# Patient Record
Sex: FEMALE | Race: WHITE | Employment: UNEMPLOYED | URBAN - METROPOLITAN AREA
[De-identification: names, ages, dates, MRNs, and addresses within clinical notes are randomized per-mention and may not be internally consistent; named-entity substitution may affect disease eponyms.]

---

## 2019-03-26 PROBLEM — Z01.84 IMMUNITY STATUS TESTING: Status: ACTIVE | Noted: 2019-03-26

## 2019-03-26 PROBLEM — Z78.9 NOT IMMUNE TO RUBELLA: Status: RESOLVED | Noted: 2019-03-26 | Resolved: 2019-03-26

## 2019-03-26 PROBLEM — R79.89 ELEVATED TSH: Status: ACTIVE | Noted: 2019-03-26

## 2019-03-26 PROBLEM — Z78.9 NOT IMMUNE TO RUBELLA: Status: ACTIVE | Noted: 2019-03-26

## 2019-07-12 LAB
HEPATITIS B SURFACE ANTIGEN OB: NEGATIVE
HIV RESULT OB: NEGATIVE
RAPID PLASMA REAGIN OB: NONREACTIVE
RH FACTOR OB: POSITIVE

## 2019-08-01 PROCEDURE — 86480 TB TEST CELL IMMUN MEASURE: CPT | Performed by: INTERNAL MEDICINE

## 2019-08-14 PROCEDURE — 82784 ASSAY IGA/IGD/IGG/IGM EACH: CPT | Performed by: PEDIATRICS

## 2019-08-14 PROCEDURE — 86648 DIPHTHERIA ANTIBODY: CPT | Performed by: PEDIATRICS

## 2019-08-14 PROCEDURE — 86774 TETANUS ANTIBODY: CPT | Performed by: PEDIATRICS

## 2019-08-14 PROCEDURE — 82785 ASSAY OF IGE: CPT | Performed by: PEDIATRICS

## 2019-08-14 PROCEDURE — 82787 IGG 1 2 3 OR 4 EACH: CPT | Performed by: PEDIATRICS

## 2019-08-14 PROCEDURE — 86317 IMMUNOASSAY INFECTIOUS AGENT: CPT | Performed by: PEDIATRICS

## 2019-11-26 LAB — HIV RESULT OB: NEGATIVE

## 2020-01-22 LAB — STREP GP B CULT OB: NEGATIVE

## 2020-02-12 ENCOUNTER — ANESTHESIA EVENT (OUTPATIENT)
Dept: OBGYN UNIT | Facility: HOSPITAL | Age: 30
End: 2020-02-12
Payer: COMMERCIAL

## 2020-02-12 ENCOUNTER — ANESTHESIA (OUTPATIENT)
Dept: OBGYN UNIT | Facility: HOSPITAL | Age: 30
End: 2020-02-12
Payer: COMMERCIAL

## 2020-02-12 ENCOUNTER — HOSPITAL ENCOUNTER (INPATIENT)
Facility: HOSPITAL | Age: 30
LOS: 2 days | Discharge: HOME | End: 2020-02-14
Attending: OBSTETRICS & GYNECOLOGY | Admitting: OBSTETRICS & GYNECOLOGY
Payer: COMMERCIAL

## 2020-02-12 PROBLEM — Z34.90 PREGNANCY: Status: ACTIVE | Noted: 2020-02-12

## 2020-02-12 LAB
ALBUMIN SERPL-MCNC: 2.3 G/DL (ref 3.4–5)
ALBUMIN/GLOB SERPL: 0.7 {RATIO} (ref 1–2)
ALP LIVER SERPL-CCNC: 183 U/L (ref 37–98)
ALT SERPL-CCNC: 17 U/L (ref 13–56)
ANION GAP SERPL CALC-SCNC: 4 MMOL/L (ref 0–18)
ANTIBODY SCREEN: NEGATIVE
AST SERPL-CCNC: 27 U/L (ref 15–37)
BASOPHILS # BLD AUTO: 0.06 X10(3) UL (ref 0–0.2)
BASOPHILS NFR BLD AUTO: 0.2 %
BILIRUB SERPL-MCNC: 0.2 MG/DL (ref 0.1–2)
BUN BLD-MCNC: 13 MG/DL (ref 7–18)
BUN/CREAT SERPL: 14.9 (ref 10–20)
CALCIUM BLD-MCNC: 8.7 MG/DL (ref 8.5–10.1)
CHLORIDE SERPL-SCNC: 113 MMOL/L (ref 98–112)
CO2 SERPL-SCNC: 21 MMOL/L (ref 21–32)
CREAT BLD-MCNC: 0.87 MG/DL (ref 0.55–1.02)
DEPRECATED RDW RBC AUTO: 43.8 FL (ref 35.1–46.3)
DEPRECATED RDW RBC AUTO: 44 FL (ref 35.1–46.3)
EOSINOPHIL # BLD AUTO: 0.01 X10(3) UL (ref 0–0.7)
EOSINOPHIL NFR BLD AUTO: 0 %
ERYTHROCYTE [DISTWIDTH] IN BLOOD BY AUTOMATED COUNT: 13.6 % (ref 11–15)
ERYTHROCYTE [DISTWIDTH] IN BLOOD BY AUTOMATED COUNT: 13.9 % (ref 11–15)
GLOBULIN PLAS-MCNC: 3.4 G/DL (ref 2.8–4.4)
GLUCOSE BLD-MCNC: 74 MG/DL (ref 70–99)
HBV SURFACE AG SER-ACNC: <0.1 [IU]/L
HBV SURFACE AG SERPL QL IA: NONREACTIVE
HCT VFR BLD AUTO: 37.4 % (ref 35–48)
HCT VFR BLD AUTO: 40 % (ref 35–48)
HCV AB SERPL QL IA: NONREACTIVE
HGB BLD-MCNC: 12.8 G/DL (ref 12–16)
HGB BLD-MCNC: 13.3 G/DL (ref 12–16)
HIV 1+2 AB+HIV1 P24 AG SERPL QL IA: NONREACTIVE
IMM GRANULOCYTES # BLD AUTO: 0.21 X10(3) UL (ref 0–1)
IMM GRANULOCYTES NFR BLD: 0.7 %
LYMPHOCYTES # BLD AUTO: 1.04 X10(3) UL (ref 1–4)
LYMPHOCYTES NFR BLD AUTO: 3.7 %
M PROTEIN MFR SERPL ELPH: 5.7 G/DL (ref 6.4–8.2)
MCH RBC QN AUTO: 29.4 PG (ref 26–34)
MCH RBC QN AUTO: 30 PG (ref 26–34)
MCHC RBC AUTO-ENTMCNC: 33.3 G/DL (ref 31–37)
MCHC RBC AUTO-ENTMCNC: 34.2 G/DL (ref 31–37)
MCV RBC AUTO: 87.8 FL (ref 80–100)
MCV RBC AUTO: 88.5 FL (ref 80–100)
MONOCYTES # BLD AUTO: 1.59 X10(3) UL (ref 0.1–1)
MONOCYTES NFR BLD AUTO: 5.6 %
NEUTROPHILS # BLD AUTO: 25.4 X10 (3) UL (ref 1.5–7.7)
NEUTROPHILS # BLD AUTO: 25.4 X10(3) UL (ref 1.5–7.7)
NEUTROPHILS NFR BLD AUTO: 89.8 %
OSMOLALITY SERPL CALC.SUM OF ELEC: 285 MOSM/KG (ref 275–295)
PLATELET # BLD AUTO: 301 10(3)UL (ref 150–450)
PLATELET # BLD AUTO: 342 10(3)UL (ref 150–450)
POTASSIUM SERPL-SCNC: 4 MMOL/L (ref 3.5–5.1)
RBC # BLD AUTO: 4.26 X10(6)UL (ref 3.8–5.3)
RBC # BLD AUTO: 4.52 X10(6)UL (ref 3.8–5.3)
RH BLOOD TYPE: POSITIVE
SODIUM SERPL-SCNC: 138 MMOL/L (ref 136–145)
T PALLIDUM AB SER QL IA: NONREACTIVE
URATE SERPL-MCNC: 7.4 MG/DL (ref 2.6–6)
WBC # BLD AUTO: 21.7 X10(3) UL (ref 4–11)
WBC # BLD AUTO: 28.3 X10(3) UL (ref 4–11)

## 2020-02-12 PROCEDURE — 86900 BLOOD TYPING SEROLOGIC ABO: CPT | Performed by: OBSTETRICS & GYNECOLOGY

## 2020-02-12 PROCEDURE — 0KQM0ZZ REPAIR PERINEUM MUSCLE, OPEN APPROACH: ICD-10-PCS | Performed by: OBSTETRICS & GYNECOLOGY

## 2020-02-12 PROCEDURE — 85027 COMPLETE CBC AUTOMATED: CPT | Performed by: OBSTETRICS & GYNECOLOGY

## 2020-02-12 PROCEDURE — 86850 RBC ANTIBODY SCREEN: CPT | Performed by: OBSTETRICS & GYNECOLOGY

## 2020-02-12 PROCEDURE — 86803 HEPATITIS C AB TEST: CPT | Performed by: PREVENTIVE MEDICINE

## 2020-02-12 PROCEDURE — 80053 COMPREHEN METABOLIC PANEL: CPT | Performed by: OBSTETRICS & GYNECOLOGY

## 2020-02-12 PROCEDURE — 86901 BLOOD TYPING SEROLOGIC RH(D): CPT | Performed by: OBSTETRICS & GYNECOLOGY

## 2020-02-12 PROCEDURE — 99204 OFFICE O/P NEW MOD 45 MIN: CPT

## 2020-02-12 PROCEDURE — 86701 HIV-1ANTIBODY: CPT | Performed by: PREVENTIVE MEDICINE

## 2020-02-12 PROCEDURE — 85025 COMPLETE CBC W/AUTO DIFF WBC: CPT | Performed by: OBSTETRICS & GYNECOLOGY

## 2020-02-12 PROCEDURE — 84550 ASSAY OF BLOOD/URIC ACID: CPT | Performed by: OBSTETRICS & GYNECOLOGY

## 2020-02-12 PROCEDURE — 86780 TREPONEMA PALLIDUM: CPT | Performed by: OBSTETRICS & GYNECOLOGY

## 2020-02-12 PROCEDURE — 87340 HEPATITIS B SURFACE AG IA: CPT | Performed by: PREVENTIVE MEDICINE

## 2020-02-12 RX ORDER — IBUPROFEN 600 MG/1
600 TABLET ORAL ONCE AS NEEDED
Status: DISCONTINUED | OUTPATIENT
Start: 2020-02-12 | End: 2020-02-12 | Stop reason: HOSPADM

## 2020-02-12 RX ORDER — TERBUTALINE SULFATE 1 MG/ML
0.25 INJECTION, SOLUTION SUBCUTANEOUS AS NEEDED
Status: DISCONTINUED | OUTPATIENT
Start: 2020-02-12 | End: 2020-02-12 | Stop reason: HOSPADM

## 2020-02-12 RX ORDER — EPHEDRINE SULFATE/0.9% NACL/PF 25 MG/5 ML
5 SYRINGE (ML) INTRAVENOUS AS NEEDED
Status: DISCONTINUED | OUTPATIENT
Start: 2020-02-12 | End: 2020-02-12

## 2020-02-12 RX ORDER — NIFEDIPINE 30 MG/1
30 TABLET, EXTENDED RELEASE ORAL DAILY
Status: DISCONTINUED | OUTPATIENT
Start: 2020-02-12 | End: 2020-02-13

## 2020-02-12 RX ORDER — DEXTROSE, SODIUM CHLORIDE, SODIUM LACTATE, POTASSIUM CHLORIDE, AND CALCIUM CHLORIDE 5; .6; .31; .03; .02 G/100ML; G/100ML; G/100ML; G/100ML; G/100ML
INJECTION, SOLUTION INTRAVENOUS AS NEEDED
Status: DISCONTINUED | OUTPATIENT
Start: 2020-02-12 | End: 2020-02-12 | Stop reason: HOSPADM

## 2020-02-12 RX ORDER — LIDOCAINE HYDROCHLORIDE AND EPINEPHRINE 15; 5 MG/ML; UG/ML
INJECTION, SOLUTION EPIDURAL AS NEEDED
Status: DISCONTINUED | OUTPATIENT
Start: 2020-02-12 | End: 2020-02-12 | Stop reason: SURG

## 2020-02-12 RX ORDER — SODIUM CHLORIDE, SODIUM LACTATE, POTASSIUM CHLORIDE, CALCIUM CHLORIDE 600; 310; 30; 20 MG/100ML; MG/100ML; MG/100ML; MG/100ML
INJECTION, SOLUTION INTRAVENOUS CONTINUOUS
Status: DISCONTINUED | OUTPATIENT
Start: 2020-02-12 | End: 2020-02-12 | Stop reason: HOSPADM

## 2020-02-12 RX ORDER — LIDOCAINE HYDROCHLORIDE 10 MG/ML
INJECTION, SOLUTION EPIDURAL; INFILTRATION; INTRACAUDAL; PERINEURAL AS NEEDED
Status: DISCONTINUED | OUTPATIENT
Start: 2020-02-12 | End: 2020-02-12 | Stop reason: SURG

## 2020-02-12 RX ORDER — TERBUTALINE SULFATE 1 MG/ML
INJECTION, SOLUTION SUBCUTANEOUS
Status: DISPENSED
Start: 2020-02-12 | End: 2020-02-12

## 2020-02-12 RX ORDER — DIPHENHYDRAMINE HYDROCHLORIDE 50 MG/ML
12.5 INJECTION INTRAMUSCULAR; INTRAVENOUS EVERY 4 HOURS PRN
Status: DISCONTINUED | OUTPATIENT
Start: 2020-02-12 | End: 2020-02-12

## 2020-02-12 RX ORDER — SIMETHICONE 80 MG
80 TABLET,CHEWABLE ORAL 3 TIMES DAILY PRN
Status: DISCONTINUED | OUTPATIENT
Start: 2020-02-12 | End: 2020-02-14

## 2020-02-12 RX ORDER — ZOLPIDEM TARTRATE 5 MG/1
5 TABLET ORAL NIGHTLY PRN
Status: DISCONTINUED | OUTPATIENT
Start: 2020-02-12 | End: 2020-02-14

## 2020-02-12 RX ORDER — BISACODYL 10 MG
10 SUPPOSITORY, RECTAL RECTAL ONCE AS NEEDED
Status: ACTIVE | OUTPATIENT
Start: 2020-02-12 | End: 2020-02-12

## 2020-02-12 RX ORDER — DOCUSATE SODIUM 100 MG/1
100 CAPSULE, LIQUID FILLED ORAL
Status: DISCONTINUED | OUTPATIENT
Start: 2020-02-12 | End: 2020-02-14

## 2020-02-12 RX ORDER — ACETAMINOPHEN 325 MG/1
650 TABLET ORAL EVERY 6 HOURS PRN
Status: DISCONTINUED | OUTPATIENT
Start: 2020-02-12 | End: 2020-02-14

## 2020-02-12 RX ORDER — AMMONIA INHALANTS 0.04 G/.3ML
0.3 INHALANT RESPIRATORY (INHALATION) AS NEEDED
Status: DISCONTINUED | OUTPATIENT
Start: 2020-02-12 | End: 2020-02-12 | Stop reason: HOSPADM

## 2020-02-12 RX ORDER — IBUPROFEN 600 MG/1
600 TABLET ORAL EVERY 6 HOURS
Status: DISCONTINUED | OUTPATIENT
Start: 2020-02-12 | End: 2020-02-14

## 2020-02-12 RX ORDER — TRISODIUM CITRATE DIHYDRATE AND CITRIC ACID MONOHYDRATE 500; 334 MG/5ML; MG/5ML
30 SOLUTION ORAL AS NEEDED
Status: DISCONTINUED | OUTPATIENT
Start: 2020-02-12 | End: 2020-02-12 | Stop reason: HOSPADM

## 2020-02-12 RX ORDER — TERBUTALINE SULFATE 1 MG/ML
0.25 INJECTION, SOLUTION SUBCUTANEOUS
Status: DISCONTINUED | OUTPATIENT
Start: 2020-02-12 | End: 2020-02-12

## 2020-02-12 RX ORDER — ACETAMINOPHEN 500 MG
500 TABLET ORAL ONCE AS NEEDED
Status: DISCONTINUED | OUTPATIENT
Start: 2020-02-12 | End: 2020-02-12 | Stop reason: HOSPADM

## 2020-02-12 RX ADMIN — LIDOCAINE HYDROCHLORIDE AND EPINEPHRINE 3 ML: 15; 5 INJECTION, SOLUTION EPIDURAL at 07:40:00

## 2020-02-12 RX ADMIN — LIDOCAINE HYDROCHLORIDE 25 MG: 10 INJECTION, SOLUTION EPIDURAL; INFILTRATION; INTRACAUDAL; PERINEURAL at 07:34:00

## 2020-02-12 NOTE — PROGRESS NOTES
Pt  EDC 20 presents to L&D with c/o large gush of fluid at 2300. Pt states irregular contractions throughout the day but nothing too painful or regular. Pt denies vag bleeding, states + fetal movement.  Pt labels verified per pt and this RN, POC di

## 2020-02-12 NOTE — ANESTHESIA PREPROCEDURE EVALUATION
PRE-OP EVALUATION    Patient Name: Shelton Donahue    Pre-op Diagnosis: * IUP*    * Labor epidural*      Pre-op vitals reviewed. Temp: 98 °F (36.7 °C)  Pulse: 85  Resp: 20  BP: 124/63  SpO2: 98 %  Body mass index is 32.44 kg/m².     Current medications re (-) hypothyroidism  (-) hyperthyroidism  (+) anemia                   Pulmonary    Negative pulmonary ROS. Neuro/Psych    Negative neuro/psych ROS.                                 Past Surgical History:   Procedure Laterality Date

## 2020-02-12 NOTE — ANESTHESIA PROCEDURE NOTES
Labor Analgesia  Performed by: Eva Mahajan MD  Authorized by: Eva Mahajan MD       General Information and Staff    Start Time:  2/12/2020 7:25 AM  End Time:  2/12/2020 7:44 AM  Anesthesiologist:  Eva Mahajan MD  Performed by:   Anesthesiologist  S

## 2020-02-12 NOTE — PROGRESS NOTES
Patient up to bathroom with assist x 2. Voiding without difficulty. Letitia care completed. Patient transferred to mother/baby room 2209 via wheelchair in stable condition with baby and personal belongings. Accompanied by significant other and staff.   Repor

## 2020-02-12 NOTE — H&P
[facility]    PATIENT'S NAME: Nicky Thakur   ATTENDING PHYSICIAN: Renita Sue M.D.    PATIENT ACCOUNT#:   [de-identified]    LOCATION:  [address] [place] [address]  MEDICAL RECORD #:   [id number]       YOB: 1990  ADMISSION DATE: Abnormal Pap smear in 2017. MEDICATIONS:  Significant for prenatal vitamins. ALLERGIES:  None. GYNECOLOGIC HISTORY:  Her menarche was age 15, every 28 day cycles for 7 days. No STDs. No abnormalities otherwise.       SOCIAL HISTORY:  No smoking

## 2020-02-12 NOTE — PROGRESS NOTES
I was called to the room as the level III physician on call due to decelerations if FHT. I walked into the room immediately after the call at 08:10  Patient was on all fours. Ephedrine was given as patient was 30 min out from epidural test dose.    David Fried

## 2020-02-12 NOTE — L&D DELIVERY NOTE
Shira Granda, Girl [[id number]]    Labor Events     labor?:  No   steroids?:  None  Antibiotics received during labor?:  No  Antibiotics (enter # doses in comment):  none  Rupture date/time:  2020 2300     Rupture type:  SROM  Fluid color: Placenta    Date/time:  2020 1401  Removal:  Spontaneous  Appearance:  Intact  Disposition:  Lab, held for future pathology     Apgars    Living status:  Living   Apgar Scoring Key:     0 1 2    Skin color Blue or pale Acrocyanotic Completely pi

## 2020-02-12 NOTE — L&D DELIVERY NOTE
[facility]    PATIENT'S NAME: Lindy Nicholson   ATTENDING PHYSICIAN: Casimiro Taylor M.D.    PATIENT ACCOUNT #: [de-identified] LOCATION:  [address] [place] [address]   MEDICAL RECORD #: [id number] YOB: 1990   ADMISSION DATE: 02/12/2020 Basil Fatima following delivery. Plan is going to be for routine postpartum care.     Dictated By Claire Velasquez M.D.  d: 02/12/2020 16:42:38  t: 02/12/2020 16:54:04  [facility] [id number]  [place]/

## 2020-02-12 NOTE — PROGRESS NOTES
Prolonged fhr deceleration into the 90s for at least 7-8 min noted and I came into the patient's room where she had oxygen on and pitocin was off. I checked her and she was complete and +1 station.  She did a test push but was not an effective pusher as she

## 2020-02-12 NOTE — PROGRESS NOTES
I was called due to prolonged FHT decleration. I came over from the office and patient was noted to be complete and FHR was back in the normal range. Patient was feeling some pressure so began pushing but was ineffective due to not feeling enough.  Chanell

## 2020-02-13 LAB
BASOPHILS # BLD AUTO: 0.05 X10(3) UL (ref 0–0.2)
BASOPHILS NFR BLD AUTO: 0.2 %
DEPRECATED RDW RBC AUTO: 45.8 FL (ref 35.1–46.3)
EOSINOPHIL # BLD AUTO: 0.11 X10(3) UL (ref 0–0.7)
EOSINOPHIL NFR BLD AUTO: 0.5 %
ERYTHROCYTE [DISTWIDTH] IN BLOOD BY AUTOMATED COUNT: 14.2 % (ref 11–15)
HCT VFR BLD AUTO: 33.4 % (ref 35–48)
HGB BLD-MCNC: 11.1 G/DL (ref 12–16)
IMM GRANULOCYTES # BLD AUTO: 0.21 X10(3) UL (ref 0–1)
IMM GRANULOCYTES NFR BLD: 0.9 %
LYMPHOCYTES # BLD AUTO: 2.35 X10(3) UL (ref 1–4)
LYMPHOCYTES NFR BLD AUTO: 10.4 %
MCH RBC QN AUTO: 29.4 PG (ref 26–34)
MCHC RBC AUTO-ENTMCNC: 33.2 G/DL (ref 31–37)
MCV RBC AUTO: 88.6 FL (ref 80–100)
MONOCYTES # BLD AUTO: 1.31 X10(3) UL (ref 0.1–1)
MONOCYTES NFR BLD AUTO: 5.8 %
NEUTROPHILS # BLD AUTO: 18.53 X10 (3) UL (ref 1.5–7.7)
NEUTROPHILS # BLD AUTO: 18.53 X10(3) UL (ref 1.5–7.7)
NEUTROPHILS NFR BLD AUTO: 82.2 %
PLATELET # BLD AUTO: 268 10(3)UL (ref 150–450)
RBC # BLD AUTO: 3.77 X10(6)UL (ref 3.8–5.3)
WBC # BLD AUTO: 22.6 X10(3) UL (ref 4–11)

## 2020-02-13 PROCEDURE — 85025 COMPLETE CBC W/AUTO DIFF WBC: CPT | Performed by: OBSTETRICS & GYNECOLOGY

## 2020-02-13 RX ORDER — NIFEDIPINE 30 MG/1
30 TABLET, EXTENDED RELEASE ORAL DAILY
Status: DISCONTINUED | OUTPATIENT
Start: 2020-02-13 | End: 2020-02-14

## 2020-02-13 NOTE — PROGRESS NOTES
Labor Analgesia Follow Up Note    Patient underwent epidural anesthesia for labor analgesia,    Placenta Date/Time: 2/12/2020  2:01 PM    Delivery Date/Time[de-identified] 2/12/2020  1:58 PM    /72 (BP Location: Left arm)   Pulse 84   Temp 98 °F (36.7 °C) (Oral)

## 2020-02-13 NOTE — BRIEF OP NOTE
[facility] Vaginal Delivery Progress Note    Shayla Hedrick Patient Status:  Inpatient    1990 MRN [id number]   [facility] 2SW-J Attending Luis Enrique V[place] Day # 1 PCP Chance Brito MD     SUBJECTIVE:

## 2020-02-14 VITALS
HEIGHT: 66 IN | DIASTOLIC BLOOD PRESSURE: 72 MMHG | OXYGEN SATURATION: 98 % | TEMPERATURE: 98 F | WEIGHT: 201 LBS | BODY MASS INDEX: 32.3 KG/M2 | RESPIRATION RATE: 18 BRPM | SYSTOLIC BLOOD PRESSURE: 132 MMHG | HEART RATE: 73 BPM

## 2020-02-14 LAB
BASOPHILS # BLD AUTO: 0.06 X10(3) UL (ref 0–0.2)
BASOPHILS NFR BLD AUTO: 0.3 %
DEPRECATED RDW RBC AUTO: 46.2 FL (ref 35.1–46.3)
EOSINOPHIL # BLD AUTO: 0.6 X10(3) UL (ref 0–0.7)
EOSINOPHIL NFR BLD AUTO: 3 %
ERYTHROCYTE [DISTWIDTH] IN BLOOD BY AUTOMATED COUNT: 14.1 % (ref 11–15)
HCT VFR BLD AUTO: 34.8 % (ref 35–48)
HGB BLD-MCNC: 11.4 G/DL (ref 12–16)
IMM GRANULOCYTES # BLD AUTO: 0.22 X10(3) UL (ref 0–1)
IMM GRANULOCYTES NFR BLD: 1.1 %
LYMPHOCYTES # BLD AUTO: 2.39 X10(3) UL (ref 1–4)
LYMPHOCYTES NFR BLD AUTO: 12.1 %
MCH RBC QN AUTO: 29.6 PG (ref 26–34)
MCHC RBC AUTO-ENTMCNC: 32.8 G/DL (ref 31–37)
MCV RBC AUTO: 90.4 FL (ref 80–100)
MONOCYTES # BLD AUTO: 1.37 X10(3) UL (ref 0.1–1)
MONOCYTES NFR BLD AUTO: 6.9 %
NEUTROPHILS # BLD AUTO: 15.08 X10 (3) UL (ref 1.5–7.7)
NEUTROPHILS # BLD AUTO: 15.08 X10(3) UL (ref 1.5–7.7)
NEUTROPHILS NFR BLD AUTO: 76.6 %
PLATELET # BLD AUTO: 289 10(3)UL (ref 150–450)
RBC # BLD AUTO: 3.85 X10(6)UL (ref 3.8–5.3)
WBC # BLD AUTO: 19.7 X10(3) UL (ref 4–11)

## 2020-02-14 PROCEDURE — 85025 COMPLETE CBC W/AUTO DIFF WBC: CPT | Performed by: OBSTETRICS & GYNECOLOGY

## 2020-02-14 RX ORDER — NIFEDIPINE 30 MG/1
30 TABLET, FILM COATED, EXTENDED RELEASE ORAL DAILY
Qty: 42 TABLET | Refills: 0 | Status: SHIPPED | OUTPATIENT
Start: 2020-02-14 | End: 2020-03-27

## 2020-02-14 NOTE — PROGRESS NOTES
Labor Analgesia Follow Up Note    Patient underwent epidural anesthesia for labor analgesia,    Placenta Date/Time: 2/12/2020  2:01 PM    Delivery Date/Time[de-identified] 2/12/2020  1:58 PM    /72   Pulse 73   Temp 98 °F (36.7 °C) (Oral)   Resp 18   Ht 1.676 m (

## 2020-02-14 NOTE — PROGRESS NOTES
[facility] Vaginal Delivery Progress Note    Erica Fisher Patient Status:  Inpatient    1990 MRN [id number]   [facility] 2SW-J Attending Alcon Guajardo # 2 PCP Nori Gonzalez MD     SUBJECTIVE:

## 2020-02-14 NOTE — DISCHARGE SUMMARY
Obstetrical Discharge Summary    Patient Name:  Amanda Montgomery  MRN:                 [id number]  YOB: 1990    Primary OB Clinician: Nela Jones 13    Admission Date: 2/12/2020    Discharge Date:  2/14/2020    Admission Diag per vagina for 6 weeks (no sex, tampons or douching)    Pt understood all instructions and was given copy on discharge home.      Anna Pyle  2/20/2020

## 2020-02-18 ENCOUNTER — TELEPHONE (OUTPATIENT)
Dept: OBGYN UNIT | Facility: HOSPITAL | Age: 30
End: 2020-02-18

## 2020-02-18 ENCOUNTER — NURSE ONLY (OUTPATIENT)
Dept: LACTATION | Facility: HOSPITAL | Age: 30
End: 2020-02-18
Attending: OBSTETRICS & GYNECOLOGY
Payer: COMMERCIAL

## 2020-02-18 DIAGNOSIS — O92.29 SORE NIPPLES DUE TO LACTATION: ICD-10-CM

## 2020-02-18 PROCEDURE — 99213 OFFICE O/P EST LOW 20 MIN: CPT

## 2020-02-18 NOTE — PROGRESS NOTES
LACTATION NOTE - MOTHER      Evaluation Type: Outpatient Initial    Problems identified  Problems identified: Knowledge deficit; Nipple pain;Milk supply WNL    Maternal history  Other/comment: Thyroid instabilities during pregnancy, no medications needed.  C

## 2020-02-19 ENCOUNTER — TELEPHONE (OUTPATIENT)
Dept: OBGYN UNIT | Facility: HOSPITAL | Age: 30
End: 2020-02-19

## 2020-02-19 NOTE — PROGRESS NOTES
Reviewed self and infant care w / mom, she verbalizes understanding of instructions reviewed. Encourage to follow up w/ MDs as directed and w/ questions/concerns. All sx of PIH reviewed, on bp med, enc to go to ct.

## 2020-10-15 ENCOUNTER — APPOINTMENT (OUTPATIENT)
Dept: ULTRASOUND IMAGING | Facility: HOSPITAL | Age: 30
End: 2020-10-15
Attending: EMERGENCY MEDICINE
Payer: COMMERCIAL

## 2020-10-15 ENCOUNTER — HOSPITAL ENCOUNTER (EMERGENCY)
Facility: HOSPITAL | Age: 30
Discharge: HOME | End: 2020-10-15
Attending: EMERGENCY MEDICINE
Payer: COMMERCIAL

## 2020-10-15 VITALS
DIASTOLIC BLOOD PRESSURE: 78 MMHG | WEIGHT: 180 LBS | SYSTOLIC BLOOD PRESSURE: 116 MMHG | HEART RATE: 70 BPM | HEIGHT: 67 IN | OXYGEN SATURATION: 98 % | TEMPERATURE: 98 F | BODY MASS INDEX: 28.25 KG/M2 | RESPIRATION RATE: 18 BRPM

## 2020-10-15 DIAGNOSIS — S86.912A MUSCLE STRAIN OF LEFT LOWER LEG, INITIAL ENCOUNTER: Primary | ICD-10-CM

## 2020-10-15 PROCEDURE — 99284 EMERGENCY DEPT VISIT MOD MDM: CPT

## 2020-10-15 PROCEDURE — 93971 EXTREMITY STUDY: CPT | Performed by: EMERGENCY MEDICINE

## 2020-10-16 NOTE — ED INITIAL ASSESSMENT (HPI)
Pt reports tingling and a warmth feeling in her left calf. States it does not radiate anywhere. Denies shortness of breath, denies pain.

## 2020-10-16 NOTE — ED PROVIDER NOTES
Patient Seen in: [facility] Emergency Department      History   Patient presents with:  Numbness Weakness    Stated Complaint: R leg tinging     HPI    66-year-old woman who has been having pain in her left foot.   She said that her podiatrist though and nonfocal         ED Course   Labs Reviewed - No data to display  Us Venous Doppler Leg Left - Diag Img (cpt=93971)    Result Date: 10/15/2020  PROCEDURE:  US VENOUS DOPPLER LEG LEFT - DIAG IMG (CPT=93971)  COMPARISON:  None.   INDICATIONS:  eval for DVT RAFITA Leon [facility] [phone]. [phone]  [phone]    In 1 week  As needed          Medications Prescribed:  Current Discharge Medication List

## 2020-11-09 PROBLEM — Z34.90 PREGNANCY: Status: RESOLVED | Noted: 2020-02-12 | Resolved: 2020-11-09

## 2021-11-08 LAB
HEPATITIS B SURFACE ANTIGEN OB: NEGATIVE
HEPATITIS B SURFACE ANTIGEN OB: NEGATIVE
HIV RESULT OB: NEGATIVE
HIV RESULT OB: NEGATIVE
RAPID PLASMA REAGIN OB: NONREACTIVE
RAPID PLASMA REAGIN OB: NONREACTIVE

## 2022-04-22 LAB
HIV RESULT OB: NEGATIVE
HIV RESULT OB: NEGATIVE

## 2022-06-15 LAB
STREP GP B CULT OB: POSITIVE
STREP GP B CULT OB: POSITIVE

## 2022-07-02 ENCOUNTER — ANESTHESIA (OUTPATIENT)
Dept: OBGYN UNIT | Facility: HOSPITAL | Age: 32
End: 2022-07-02
Payer: COMMERCIAL

## 2022-07-02 ENCOUNTER — ANESTHESIA EVENT (OUTPATIENT)
Dept: OBGYN UNIT | Facility: HOSPITAL | Age: 32
End: 2022-07-02
Payer: COMMERCIAL

## 2022-07-02 ENCOUNTER — HOSPITAL ENCOUNTER (INPATIENT)
Facility: HOSPITAL | Age: 32
LOS: 2 days | Discharge: HOME | End: 2022-07-04
Attending: STUDENT IN AN ORGANIZED HEALTH CARE EDUCATION/TRAINING PROGRAM | Admitting: STUDENT IN AN ORGANIZED HEALTH CARE EDUCATION/TRAINING PROGRAM
Payer: COMMERCIAL

## 2022-07-02 PROBLEM — Z34.90 PREGNANCY: Status: ACTIVE | Noted: 2022-07-02

## 2022-07-02 LAB
ANTIBODY SCREEN: NEGATIVE
BASOPHILS # BLD AUTO: 0.05 X10(3) UL (ref 0–0.2)
BASOPHILS NFR BLD AUTO: 0.3 %
EOSINOPHIL # BLD AUTO: 0.37 X10(3) UL (ref 0–0.7)
EOSINOPHIL NFR BLD AUTO: 2.3 %
ERYTHROCYTE [DISTWIDTH] IN BLOOD BY AUTOMATED COUNT: 14.2 %
HCT VFR BLD AUTO: 40.4 %
HGB BLD-MCNC: 13.3 G/DL
IMM GRANULOCYTES # BLD AUTO: 0.13 X10(3) UL (ref 0–1)
IMM GRANULOCYTES NFR BLD: 0.8 %
LYMPHOCYTES # BLD AUTO: 1.94 X10(3) UL (ref 1–4)
LYMPHOCYTES NFR BLD AUTO: 12.2 %
MCH RBC QN AUTO: 27.9 PG (ref 26–34)
MCHC RBC AUTO-ENTMCNC: 32.9 G/DL (ref 31–37)
MCV RBC AUTO: 84.9 FL
MONOCYTES # BLD AUTO: 1.25 X10(3) UL (ref 0.1–1)
MONOCYTES NFR BLD AUTO: 7.8 %
NEUTROPHILS # BLD AUTO: 12.19 X10 (3) UL (ref 1.5–7.7)
NEUTROPHILS # BLD AUTO: 12.19 X10(3) UL (ref 1.5–7.7)
NEUTROPHILS NFR BLD AUTO: 76.6 %
PLATELET # BLD AUTO: 298 10(3)UL (ref 150–450)
RBC # BLD AUTO: 4.76 X10(6)UL
RH BLOOD TYPE: POSITIVE
SARS-COV-2 RNA RESP QL NAA+PROBE: NOT DETECTED
T PALLIDUM AB SER QL IA: NONREACTIVE
WBC # BLD AUTO: 15.9 X10(3) UL (ref 4–11)

## 2022-07-02 PROCEDURE — 86900 BLOOD TYPING SEROLOGIC ABO: CPT | Performed by: STUDENT IN AN ORGANIZED HEALTH CARE EDUCATION/TRAINING PROGRAM

## 2022-07-02 PROCEDURE — 0KQM0ZZ REPAIR PERINEUM MUSCLE, OPEN APPROACH: ICD-10-PCS | Performed by: STUDENT IN AN ORGANIZED HEALTH CARE EDUCATION/TRAINING PROGRAM

## 2022-07-02 PROCEDURE — 85025 COMPLETE CBC W/AUTO DIFF WBC: CPT | Performed by: STUDENT IN AN ORGANIZED HEALTH CARE EDUCATION/TRAINING PROGRAM

## 2022-07-02 PROCEDURE — 86850 RBC ANTIBODY SCREEN: CPT | Performed by: STUDENT IN AN ORGANIZED HEALTH CARE EDUCATION/TRAINING PROGRAM

## 2022-07-02 PROCEDURE — 10907ZC DRAINAGE OF AMNIOTIC FLUID, THERAPEUTIC FROM PRODUCTS OF CONCEPTION, VIA NATURAL OR ARTIFICIAL OPENING: ICD-10-PCS | Performed by: STUDENT IN AN ORGANIZED HEALTH CARE EDUCATION/TRAINING PROGRAM

## 2022-07-02 PROCEDURE — 99214 OFFICE O/P EST MOD 30 MIN: CPT

## 2022-07-02 PROCEDURE — 86901 BLOOD TYPING SEROLOGIC RH(D): CPT | Performed by: STUDENT IN AN ORGANIZED HEALTH CARE EDUCATION/TRAINING PROGRAM

## 2022-07-02 PROCEDURE — 86780 TREPONEMA PALLIDUM: CPT | Performed by: STUDENT IN AN ORGANIZED HEALTH CARE EDUCATION/TRAINING PROGRAM

## 2022-07-02 RX ORDER — DOCUSATE SODIUM 100 MG/1
100 CAPSULE, LIQUID FILLED ORAL
Status: DISCONTINUED | OUTPATIENT
Start: 2022-07-02 | End: 2022-07-04

## 2022-07-02 RX ORDER — SODIUM CHLORIDE, SODIUM LACTATE, POTASSIUM CHLORIDE, CALCIUM CHLORIDE 600; 310; 30; 20 MG/100ML; MG/100ML; MG/100ML; MG/100ML
INJECTION, SOLUTION INTRAVENOUS CONTINUOUS
Status: DISCONTINUED | OUTPATIENT
Start: 2022-07-02 | End: 2022-07-02

## 2022-07-02 RX ORDER — ASPIRIN 81 MG/1
121 TABLET, CHEWABLE ORAL DAILY
COMMUNITY
End: 2022-07-04

## 2022-07-02 RX ORDER — NALBUPHINE HCL 10 MG/ML
2.5 AMPUL (ML) INJECTION
Status: DISCONTINUED | OUTPATIENT
Start: 2022-07-02 | End: 2022-07-02

## 2022-07-02 RX ORDER — ACETAMINOPHEN 500 MG
500 TABLET ORAL EVERY 6 HOURS PRN
Status: DISCONTINUED | OUTPATIENT
Start: 2022-07-02 | End: 2022-07-02

## 2022-07-02 RX ORDER — TRISODIUM CITRATE DIHYDRATE AND CITRIC ACID MONOHYDRATE 500; 334 MG/5ML; MG/5ML
30 SOLUTION ORAL AS NEEDED
Status: DISCONTINUED | OUTPATIENT
Start: 2022-07-02 | End: 2022-07-02

## 2022-07-02 RX ORDER — BISACODYL 10 MG
10 SUPPOSITORY, RECTAL RECTAL ONCE AS NEEDED
Status: DISCONTINUED | OUTPATIENT
Start: 2022-07-02 | End: 2022-07-04

## 2022-07-02 RX ORDER — ACETAMINOPHEN 500 MG
1000 TABLET ORAL EVERY 6 HOURS PRN
Status: DISCONTINUED | OUTPATIENT
Start: 2022-07-02 | End: 2022-07-04

## 2022-07-02 RX ORDER — IBUPROFEN 600 MG/1
600 TABLET ORAL EVERY 6 HOURS PRN
Status: DISCONTINUED | OUTPATIENT
Start: 2022-07-02 | End: 2022-07-02

## 2022-07-02 RX ORDER — FERROUS SULFATE TAB EC 324 MG (65 MG FE EQUIVALENT) 324 (65 FE) MG
TABLET DELAYED RESPONSE ORAL EVERY OTHER DAY
COMMUNITY
End: 2022-07-04

## 2022-07-02 RX ORDER — TERBUTALINE SULFATE 1 MG/ML
0.25 INJECTION, SOLUTION SUBCUTANEOUS AS NEEDED
Status: DISCONTINUED | OUTPATIENT
Start: 2022-07-02 | End: 2022-07-02

## 2022-07-02 RX ORDER — BUPIVACAINE HCL/0.9 % NACL/PF 0.25 %
5 PLASTIC BAG, INJECTION (ML) EPIDURAL AS NEEDED
Status: DISCONTINUED | OUTPATIENT
Start: 2022-07-02 | End: 2022-07-02

## 2022-07-02 RX ORDER — ACETAMINOPHEN 500 MG
500 TABLET ORAL EVERY 6 HOURS PRN
Status: DISCONTINUED | OUTPATIENT
Start: 2022-07-02 | End: 2022-07-04

## 2022-07-02 RX ORDER — AMMONIA INHALANTS 0.04 G/.3ML
0.3 INHALANT RESPIRATORY (INHALATION) AS NEEDED
Status: DISCONTINUED | OUTPATIENT
Start: 2022-07-02 | End: 2022-07-02

## 2022-07-02 RX ORDER — DEXTROSE, SODIUM CHLORIDE, SODIUM LACTATE, POTASSIUM CHLORIDE, AND CALCIUM CHLORIDE 5; .6; .31; .03; .02 G/100ML; G/100ML; G/100ML; G/100ML; G/100ML
INJECTION, SOLUTION INTRAVENOUS CONTINUOUS
Status: DISCONTINUED | OUTPATIENT
Start: 2022-07-02 | End: 2022-07-02

## 2022-07-02 RX ORDER — HYDROMORPHONE HYDROCHLORIDE 1 MG/ML
1 INJECTION, SOLUTION INTRAMUSCULAR; INTRAVENOUS; SUBCUTANEOUS EVERY 4 HOURS PRN
Status: DISCONTINUED | OUTPATIENT
Start: 2022-07-02 | End: 2022-07-02

## 2022-07-02 RX ORDER — ONDANSETRON 2 MG/ML
4 INJECTION INTRAMUSCULAR; INTRAVENOUS EVERY 6 HOURS PRN
Status: DISCONTINUED | OUTPATIENT
Start: 2022-07-02 | End: 2022-07-02

## 2022-07-02 RX ORDER — IBUPROFEN 600 MG/1
600 TABLET ORAL EVERY 6 HOURS
Status: DISCONTINUED | OUTPATIENT
Start: 2022-07-02 | End: 2022-07-04

## 2022-07-02 RX ORDER — SIMETHICONE 80 MG
80 TABLET,CHEWABLE ORAL 3 TIMES DAILY PRN
Status: DISCONTINUED | OUTPATIENT
Start: 2022-07-02 | End: 2022-07-04

## 2022-07-02 NOTE — PROGRESS NOTES
Pt is a 28year old female admitted to TRG3/TRG3-A. Patient presents with:  R/o Labor: ctx about q5 mins apart 10am, denies any leaking of fluid or vaginal bleeding, active fetal movement     Pt is  38w5d intra-uterine pregnancy. History obtained, consents signed. Oriented to room, staff, and plan of care.

## 2022-07-02 NOTE — PLAN OF CARE
Problem: BIRTH - VAGINAL/ SECTION  Goal: Fetal and maternal status remain reassuring during the birth process  Description: INTERVENTIONS:  - Monitor vital signs  - Monitor fetal heart rate  - Monitor uterine activity  - Monitor labor progression (vaginal delivery)  - DVT prophylaxis (C/S delivery)  - Surgical antibiotic prophylaxis (C/S delivery)  2022 by Sinan Fried RN  Outcome: Progressing  2022 by Sinan Fried RN  Outcome: Progressing     Problem: PAIN - ADULT  Goal: Verbalizes/displays adequate comfort level or patient's stated pain goal  Description: INTERVENTIONS:  - Encourage pt to monitor pain and request assistance  - Assess pain using appropriate pain scale  - Administer analgesics based on type and severity of pain and evaluate response  - Implement non-pharmacological measures as appropriate and evaluate response  - Consider cultural and social influences on pain and pain management  - Manage/alleviate anxiety  - Utilize distraction and/or relaxation techniques  - Monitor for opioid side effects  - Notify MD/LIP if interventions unsuccessful or patient reports new pain  - Anticipate increased pain with activity and pre-medicate as appropriate  2022 by Sinan Fried RN  Outcome: Progressing  2022 by Sinan Fried RN  Outcome: Progressing     Problem: ANXIETY  Goal: Will report anxiety at manageable levels  Description: INTERVENTIONS:  - Administer medication as ordered  - Teach and rehearse alternative coping skills  - Provide emotional support with 1:1 interaction with staff  2022 by Sinan Fried RN  Outcome: Progressing  2022 by Sinan Fried RN  Outcome: Progressing     Problem: Patient/Family Goals  Goal: Patient/Family Long Term Goal  Description: Patient's Long Term Goal: Safe delivery for mom and baby    Interventions:  - See additional Care Plan goals for specific interventions  2022 by Sinan Fried RN  Outcome: Progressing  7/2/2022 1429 by Milady Zuluaga RN  Outcome: Progressing  Goal: Patient/Family Short Term Goal  Description: Patient's Short Term Goal: Adequate pain control    Interventions:     - See additional Care Plan goals for specific interventions  7/2/2022 1430 by Milady Zuluaga RN  Outcome: Progressing  7/2/2022 1429 by Milady Zuluaga RN  Outcome: Progressing

## 2022-07-02 NOTE — ANESTHESIA PROCEDURE NOTES
Labor Analgesia  Performed by: Sarah Khan MD  Authorized by: Sarah Khan MD       General Information and Staff    Start Time:  7/2/2022 6:20 PM  End Time:  7/2/2022 6:35 PM  Anesthesiologist:  Sarah Khan MD  Performed by:   Anesthesiologist  Patient Location:  OB  Site Identification: surface landmarks    Reason for Block: labor epidural    Preanesthetic Checklist: patient identified, IV checked, risks and benefits discussed, monitors and equipment checked, pre-op evaluation, timeout performed, IV bolus, anesthesia consent and sterile technique used      Procedure Details    Patient Position:  Sitting  Prep: ChloraPrep    Monitoring:  Heart rate and continuous pulse ox  Approach:  Midline    Epidural Needle    Injection Technique:  MARIA DOLORES saline  Needle Type:  Tuohy  Needle Gauge:  17 G  Needle Length:  3.375 in  Needle Insertion Depth:  5  Location:  L3-4    Spinal Needle      Catheter    Catheter Type:  End hole  Catheter Size:  19 G  Catheter at Skin Depth:  10  Test Dose:  Negative    Assessment      Additional Comments     Test Dose Given at 1835 pm  Fentanyl 2 mc/ml + Ropivicaine 0.15% epidural infusion 12cc/hr  Fentanyl 2 mc/ml + Ropivicaine 0.15% epidural bolus 7 cc  Fentanyl 50 mcg/mL 100 mcg

## 2022-07-03 LAB
BASOPHILS # BLD AUTO: 0.05 X10(3) UL (ref 0–0.2)
BASOPHILS NFR BLD AUTO: 0.2 %
EOSINOPHIL # BLD AUTO: 0.35 X10(3) UL (ref 0–0.7)
EOSINOPHIL NFR BLD AUTO: 1.7 %
ERYTHROCYTE [DISTWIDTH] IN BLOOD BY AUTOMATED COUNT: 14.3 %
HCT VFR BLD AUTO: 34.5 %
HGB BLD-MCNC: 11 G/DL
IMM GRANULOCYTES # BLD AUTO: 0.13 X10(3) UL (ref 0–1)
IMM GRANULOCYTES NFR BLD: 0.6 %
LYMPHOCYTES # BLD AUTO: 2.34 X10(3) UL (ref 1–4)
LYMPHOCYTES NFR BLD AUTO: 11.7 %
MCH RBC QN AUTO: 27.9 PG (ref 26–34)
MCHC RBC AUTO-ENTMCNC: 31.9 G/DL (ref 31–37)
MCV RBC AUTO: 87.6 FL
MONOCYTES # BLD AUTO: 1.76 X10(3) UL (ref 0.1–1)
MONOCYTES NFR BLD AUTO: 8.8 %
NEUTROPHILS # BLD AUTO: 15.38 X10 (3) UL (ref 1.5–7.7)
NEUTROPHILS # BLD AUTO: 15.38 X10(3) UL (ref 1.5–7.7)
NEUTROPHILS NFR BLD AUTO: 77 %
PLATELET # BLD AUTO: 245 10(3)UL (ref 150–450)
RBC # BLD AUTO: 3.94 X10(6)UL
WBC # BLD AUTO: 20 X10(3) UL (ref 4–11)

## 2022-07-03 PROCEDURE — 85025 COMPLETE CBC W/AUTO DIFF WBC: CPT | Performed by: STUDENT IN AN ORGANIZED HEALTH CARE EDUCATION/TRAINING PROGRAM

## 2022-07-03 NOTE — PROGRESS NOTES
AROM with large amount of clear fluid at 1928. Comfortable with epidural. 6/90/-1 with contraction following AROM. Category 2 tracing with variables after AROM.      Celedonio Meigs, MD

## 2022-07-03 NOTE — PROGRESS NOTES
Pt transferred to Mother Baby room 2193 in stable condition. Report given to Kellen Anguiano [place]. Infant transferred with mother in stable condition.

## 2022-07-03 NOTE — L&D DELIVERY NOTE
Julissaa Expose [[id number]]    Labor Events     labor?: No   steroids?: None  Antibiotics received during labor?: Yes  Antibiotics (enter # doses in comment): ampicillin  Rupture date/time: 2022 1928     Rupture type: AROM  Fluid color: Clear  Induction: None  Augmentation: None  Intrapartum & labor complications: Group B beta strep +, Variable decelerations, Late decelerations     Labor Length    1st stage: 10h 36m  2nd stage: 1h 03m  3rd stage: 0h 05m     Labor Event Times    Labor onset date/time: 2022 1000  Dilation complete date/time: 2022  Start pushing date/time: 2022     [facility] Presentation    Presentation: Vertex  Position: Right Occiput Anterior     Operative Delivery    Operative Vaginal Delivery: No            Shoulder Dystocia    Shoulder Dystocia: No     Anesthesia    Method: Epidural           Delivery    Delivery date/time:  22 21:39:19   Delivery type: Normal spontaneous vaginal delivery    Details:     Delivery location: delivery room     Delivery Providers    Delivering Clinician: Don Marrero MD   Delivery personnel:  Provider Role   Jack Barillas RN Baby Nurse   Winnie Gtz RN Delivery Nurse         Cord    Vessels: 3 Vessels  Complications: Nuchal  # of loops: 1  Timed cord clamping: Yes  Time in sec: 15  Cord blood disposition: to lab  Gases sent?: Yes     Resuscitation    Method: Oxygen     [facility]ora Measurements    Weight: 3570 g 7 lb 13.9 oz Length: 51.4 cm   Head circum.: 32.5 cm Chest circum.: 33 cm      Abdominal circum.: 31 cm       Placenta    Date/time: 2022  Removal: Spontaneous  Appearance: Intact  Disposition: held for future pathology     Apgars    Living status: Living   Apgar Scoring Key:    0 1 2    Skin color Blue or pale Acrocyanotic Completely pink    Heart rate Absent <100 bpm >100 bpm    Reflex irritability No response Grimace Cry or active withdrawal    Muscle tone Limp Some flexion Active motion    Respiratory effort Absent Weak cry; hypoventilation Good, crying              1 Minute:  5 Minute:  10 Minute:  15 Minute:  20 Minute:    Skin color: 0  1       Heart rate: 2  2       Reflex irritablity: 2  2       Muscle tone: 2  2       Respiratory effort: 2  2       Total: 8  9          Apgars assigned by: Brandon Valencia   disposition: with mother     Skin to Skin    Skin to skin initiated date/time: 2022  Skin to skin with: Mother     Vaginal Count    Initial count RN: Sanjeev Warren, RN  Initial count Tech: Maritza Annas   Sponges   Sharps    Initial counts 11   0    Final counts 11   1    Final count RN: Sanjeev Warren RN  Final count MD: Frankey Conrad, MD     Delivery (Maternal)    Episiotomy: Median  Indications for episiotomy: Fetal Intolerance of Labor  Perineal lacerations: 2nd Repaired?: Yes   Vaginal laceration?: No    Cervical laceration?: No    Clitoral laceration?: No Repaired?: No   Quantitative blood loss (mL): 271                                                                  Vaginal Delivery Note          Rain Azar Patient Status:  Inpatient    1990 MRN [id number]   Location [address] [place] [address] Attending Frankey Conrad, MD   Hosp Day # 0 PCP Leta Paiz MD       Preprocedure Dx:  IUP at 38w5d in labor, GBS positive, history of postpartum HTN    Post Procedure Dx: Same - delivered    Procedure: Normal Spontaneous Vaginal Delivery, midline episiotomy    Physician: Bruce Rogers MD    Anesthesia: Epidural    QBL: 270.51 mL    Findings:   1) A viable male infant with Apgars of 8 and 9 weighing 7lbs 14 oz was delivered in KAIN position. 2) 3 vessel cord with nuchal x1  3) Normal appearing placenta spontaneously delivered intact. 4) 2nd degree laceration    Procedure:  Patient is a 31y/o  who presented at 38w5d gestation complaining of contractions.  She was admitted for labor with a cervical exam of 3/70/-2 and a bulging bag of water. She was managed expectantly while Ampicillin was given. Epidural was placed per patient request. AROM was performed at 1928 with clear fluid. Cervical exam was 6/90/-1. Pitocin was started for irregular contraction pattern. Her labor progressed, and upon complete dilation she had a strong urge to push. At the time of complete dilation the fetal heart beat had a prolonged deceleration down to 60 bpm with good recover. The patient pushed for approximately 45 minutes. The perineum was prepped. The patient was  with the following two contractions. The fetal heart rate was in the 50s-60s bpm. A superficial tear of the perineum was visualized in the midline. The patient agreed to episiotomy at this midline site with the fetal heart rate low. The head delivered with the following push. The infant was delivered in the KAIN position. The shoulders rotated easily, and the anterior shoulder delivered easily followed by the posterior shoulder and remainder of the infant. A nuchal cord was noted and reduced after delivery. The infant's mouth and nose were bulb suctioned. Fetal tone was poor. The cord was doubly clamped and cut at 30 seconds, and the baby was taken to the warmer for resuscitation. The cord blood was collected, and the placenta spontaneously delivered intact shortly thereafter. Bimanual exam was performed to the fundus. Retained products were not palpated. Examination of the cervix, vagina, and perineum demonstrated a 2nd degree laceration. The vaginal laceration was repaired using 2-0 vicryl in a running locked fashion from the apex of the tear to the level of the hymenal ring. A deep crown stitch was placed, and the perineal body was re-approximated using 2-0 vicryl in an running fashion. The skin was re-approximated using 2-0 vicryl in a subcuticular fashion. A recto-vaginal exam was normal and bleeding was minimal. The patient was then moved to the supine position in stable condition. Counts were correct.     Complications:  None    Tere Ayon MD  7/2/2022  10:10 PM

## 2022-07-04 VITALS
DIASTOLIC BLOOD PRESSURE: 60 MMHG | TEMPERATURE: 98 F | SYSTOLIC BLOOD PRESSURE: 119 MMHG | BODY MASS INDEX: 33.74 KG/M2 | HEART RATE: 73 BPM | RESPIRATION RATE: 18 BRPM | WEIGHT: 215 LBS | OXYGEN SATURATION: 98 % | HEIGHT: 67 IN

## 2022-07-04 PROBLEM — R79.89 ELEVATED TSH: Status: RESOLVED | Noted: 2019-03-26 | Resolved: 2022-07-04

## 2022-07-04 PROBLEM — Z34.90 PREGNANCY: Status: RESOLVED | Noted: 2022-07-02 | Resolved: 2022-07-04

## 2022-07-04 NOTE — PROGRESS NOTES
Patient discharged home with family. Discharge instructions explained and given to family. Family verbalized understanding. All questions answered.

## 2022-07-04 NOTE — DISCHARGE SUMMARY
Obstetrical Discharge Summary    Patient Name:  Billy Lorenzana  MRN:                 [id number]  YOB: 1990    Primary OB Clinician: Nela Jones 13    Admission Date: 2022    Discharge Date:  2022    Admission Diagnosis: 35yo  at 38w5d in labor    Discharge Diagnoses: 28year old  s/p vaginal delivery    Antepartum complications:   GBS positive  Hx of gestational HTN, no issues in current pregnancy  Anemia in 3rd trimester on oral iron    Intrapartum complications: None    Delivery: spontaneous vaginal delivery at 38w5d      Baby: Liveborn, male    Hospital Course: The patient was admitted to L&D on 22 for labor. The patient progressed well through her labor. On 22 at 2139, pt underwent uneventful spontaneous vaginal delivery with viable male infant. Apgars 8/9, weight 7 # 14 oz. Please see delivery note for details. Overall, pt did well in post-partum period. On postpartum day 1, patient was doing well. Pain was well-controlled. Lochia decreasing. She was tolerating a general diet. She was breast feeding baby. Vital signs were stable. Physical exam was within normal limits. The patient continued to meet postpartum expectations. Pt was discharged home on post-partum day 2 on 2022. Recent Labs:  Recent Labs   Lab 22  1319 22  0703   RBC 4.76 3.94   HGB 13.3 11.0*   HCT 40.4 34.5*   MCV 84.9 87.6   MCH 27.9 27.9   MCHC 32.9 31.9   RDW 14.2 14.3   NEPRELIM 12.19* 15.38*   WBC 15.9* 20.0*   .0 245.0       Discharge condition:  D/C'ed home in stable condition    Discharge diet:  general    Discharge medications:       Medication List      CONTINUE taking these medications    Cholecalciferol 100 MCG (4000 UT) Tabs     Flovent  MCG/ACT Aero  Generic drug: Fluticasone Propionate HFA  INHALE 2 PUFFS IN MOUTH AND SWALLOW TWICE DAILY.  SWISH WATER IN MOUTH AND SPIT OUT AFTER 2 PUFFS     PRENATAL VITAMIN OR        STOP taking these medications    aspirin 81 MG Chew     Ferrous Sulfate 324 (65 Fe) MG Tbec              Follow up: Follow-up in the office in 6 weeks. Activity Restrictions:  Nothing per vagina for 6 weeks (no sex, tampons or douching)    Pt understood all instructions and was given copy on discharge home.      Neha Beck MD  7/6/2022

## 2022-07-08 ENCOUNTER — TELEPHONE (OUTPATIENT)
Dept: OBGYN UNIT | Facility: HOSPITAL | Age: 32
End: 2022-07-08

## 2022-07-08 NOTE — PROGRESS NOTES
Reviewed self and infant care w / mom, she verbalizes understanding of instructions reviewed. Encourage to follow up w/ MDs as directed and w/ questions/concerns. Sx of SUKHWINDER archuleta, denies bp problems.

## 2022-07-15 ENCOUNTER — NURSE ONLY (OUTPATIENT)
Dept: LACTATION | Facility: HOSPITAL | Age: 32
End: 2022-07-15
Attending: STUDENT IN AN ORGANIZED HEALTH CARE EDUCATION/TRAINING PROGRAM
Payer: COMMERCIAL

## 2022-07-15 DIAGNOSIS — O92.29 SORE NIPPLES DUE TO LACTATION: Primary | ICD-10-CM

## 2022-07-15 PROCEDURE — 99213 OFFICE O/P EST LOW 20 MIN: CPT

## 2022-07-27 ENCOUNTER — NURSE ONLY (OUTPATIENT)
Dept: LACTATION | Facility: HOSPITAL | Age: 32
End: 2022-07-27
Attending: OBSTETRICS & GYNECOLOGY
Payer: COMMERCIAL

## 2022-07-27 DIAGNOSIS — I73.9 VASOSPASM (HCC): ICD-10-CM

## 2022-07-27 DIAGNOSIS — O92.29 POSTPARTUM NIPPLE PAIN: Primary | ICD-10-CM

## 2022-07-27 PROCEDURE — 99213 OFFICE O/P EST LOW 20 MIN: CPT

## 2022-07-27 NOTE — PROGRESS NOTES
LACTATION NOTE - [address] [facility]  presents with her son Simeon Coronel presents with c/o maternal left nipple pain with and sometimes just after breastfeeding. Mother demonstrated excellent latching skills and did not have discomfort with the deep latch achieved, however after the feeding was some discomfort on the left nipple - the face of the nipple shape was unchanged, the latch was comfortable throughout and appeared deep and the milk transfer was efficient however was snow white possibly indicative of vasospasm/Raynauds. Although less usual to have it only on one side it is possible. It also cannot be ruled that the baby's  Compression on the nipple caused it, but it did not appear to be so. However Patsy Guillaume does have a sucking blister on the top lip and some labial restriction observed, but the tongue did appear to work effectively and milk removal efficient and comfortable. Reviewed with mother to keep nipples warm to minimize pain and promote circulation. Ke transferred 104 ml at the breast in 20 minutes. Mom uses cross cradle on the right side and football on the left side as baby prefers this positioning likely due to left clavicle that mom reports has a fracture/is healing. Mom brought her SPECTRA pump for flange evaluation, a 21 mm flange was used and is a proper fit for mom. Mother had no further questions, encouraged to call for any breastfeeding concerns. Problems identified  Problems identified: Nipple pain;Knowledge deficit; Recent antibiotic use  Problems Identified Other: Vee Last c/o left nipple pain- latch is deep using asymmetrical technique - a little extra diligence to ensure a deep latch - baby does have a little labial tighness and upper lip blister but can latch deeply-  Nicoles face of the nipple is white after a feeding and the shape of the nipple is unchanged and the discomfort occurs after thee feeding which is suspicious for vasospasm/Raynauds - it occurs only on the left side which is less usual for Raynauds-however the latch was deep, no latch on pain and nipple shape unchanged    Maternal history  Other/comment: Hx Gestational HTN,  GBS +. Infant born at 39+ weeks gestation. Breastfeeding goal  Breastfeeding goal: To maintain breast milk feeding per patient goal (mom highly motivated to sucessfully  her son)    Maternal Assessment  Bilateral Breasts: Symmetrical;Full  Bilateral Nipples: Slightly everted/short (WNL - no escoration or signs of nipple trauma)  Left Nipple: Slightly everted/short;Skin intact (no signs of nipple damage- however AFTER a latched feeding - no shape change but the nipple face was white and mom reports the discomfort after the feeding)  Prior breastfeeding experience (comment below): Multip; Successful (exclsuive breastfeeding for 15 months with a good supply)  Prior BF experience: comment: BF x 15 months. Breastfeeding Assistance: Breastfeeding assistance provided with permission    Pain assessment  Location/Comment: left nipple pain only some after a feeding - no nipple pain with the deep asymmetrical latch  Treatment of Sore Nipples: Deeper latch techniques; Other (Comment) (keep nipples warm as the blanching may be vasospasm - (nipple did not appear compressed-no shape change after feeding - only color change))    Guidelines for use of:  Breast pump type: Spectra  Current use of pump[de-identified] not really using the pump- did breing pump for flange evaluation - 21mm flange siz assesed and fit is good  Reported pumping volumes (ml): 1.5+ ounces after the feeding  Post-feed pumped volume: about 45 ml  Other (comment): Although less typical to have Raynauds unilateral - it may be vasospasm as mom achieved a deep latch but the face of the nipple was white and discomfort after the feeding.